# Patient Record
Sex: MALE | URBAN - METROPOLITAN AREA
[De-identification: names, ages, dates, MRNs, and addresses within clinical notes are randomized per-mention and may not be internally consistent; named-entity substitution may affect disease eponyms.]

---

## 2019-07-27 ENCOUNTER — INPATIENT (INPATIENT)
Facility: HOSPITAL | Age: 29
LOS: 2 days | Discharge: HOME | End: 2019-07-30
Attending: SURGERY | Admitting: SURGERY
Payer: MEDICAID

## 2019-07-27 VITALS
RESPIRATION RATE: 20 BRPM | HEART RATE: 100 BPM | SYSTOLIC BLOOD PRESSURE: 156 MMHG | OXYGEN SATURATION: 98 % | DIASTOLIC BLOOD PRESSURE: 111 MMHG | TEMPERATURE: 98 F

## 2019-07-27 LAB
ALBUMIN SERPL ELPH-MCNC: 4.4 G/DL — SIGNIFICANT CHANGE UP (ref 3.3–5)
ALP SERPL-CCNC: 104 U/L — SIGNIFICANT CHANGE UP (ref 40–120)
ALT FLD-CCNC: 310 U/L — HIGH (ref 4–41)
ANION GAP SERPL CALC-SCNC: 11 MMO/L — SIGNIFICANT CHANGE UP (ref 7–14)
AST SERPL-CCNC: 66 U/L — HIGH (ref 4–40)
BASE EXCESS BLDV CALC-SCNC: 7.8 MMOL/L — SIGNIFICANT CHANGE UP
BASOPHILS # BLD AUTO: 0.02 K/UL — SIGNIFICANT CHANGE UP (ref 0–0.2)
BASOPHILS NFR BLD AUTO: 0.3 % — SIGNIFICANT CHANGE UP (ref 0–2)
BILIRUB SERPL-MCNC: 0.5 MG/DL — SIGNIFICANT CHANGE UP (ref 0.2–1.2)
BLOOD GAS VENOUS - CREATININE: SIGNIFICANT CHANGE UP MG/DL (ref 0.5–1.3)
BUN SERPL-MCNC: 15 MG/DL — SIGNIFICANT CHANGE UP (ref 7–23)
CALCIUM SERPL-MCNC: 9.8 MG/DL — SIGNIFICANT CHANGE UP (ref 8.4–10.5)
CHLORIDE BLDV-SCNC: 102 MMOL/L — SIGNIFICANT CHANGE UP (ref 96–108)
CHLORIDE SERPL-SCNC: 98 MMOL/L — SIGNIFICANT CHANGE UP (ref 98–107)
CO2 SERPL-SCNC: 29 MMOL/L — SIGNIFICANT CHANGE UP (ref 22–31)
CREAT SERPL-MCNC: 1.06 MG/DL — SIGNIFICANT CHANGE UP (ref 0.5–1.3)
EOSINOPHIL # BLD AUTO: 0.13 K/UL — SIGNIFICANT CHANGE UP (ref 0–0.5)
EOSINOPHIL NFR BLD AUTO: 1.7 % — SIGNIFICANT CHANGE UP (ref 0–6)
GAS PNL BLDV: 137 MMOL/L — SIGNIFICANT CHANGE UP (ref 136–146)
GLUCOSE BLDV-MCNC: 111 MG/DL — HIGH (ref 70–99)
GLUCOSE SERPL-MCNC: 111 MG/DL — HIGH (ref 70–99)
HCO3 BLDV-SCNC: 29 MMOL/L — HIGH (ref 20–27)
HCT VFR BLD CALC: 46.2 % — SIGNIFICANT CHANGE UP (ref 39–50)
HCT VFR BLDV CALC: 47.7 % — SIGNIFICANT CHANGE UP (ref 39–51)
HGB BLD-MCNC: 15.3 G/DL — SIGNIFICANT CHANGE UP (ref 13–17)
HGB BLDV-MCNC: 15.6 G/DL — SIGNIFICANT CHANGE UP (ref 13–17)
IMM GRANULOCYTES NFR BLD AUTO: 0.1 % — SIGNIFICANT CHANGE UP (ref 0–1.5)
LACTATE BLDV-MCNC: 1.1 MMOL/L — SIGNIFICANT CHANGE UP (ref 0.5–2)
LIDOCAIN IGE QN: 173.2 U/L — HIGH (ref 7–60)
LYMPHOCYTES # BLD AUTO: 2.44 K/UL — SIGNIFICANT CHANGE UP (ref 1–3.3)
LYMPHOCYTES # BLD AUTO: 31.7 % — SIGNIFICANT CHANGE UP (ref 13–44)
MCHC RBC-ENTMCNC: 30.1 PG — SIGNIFICANT CHANGE UP (ref 27–34)
MCHC RBC-ENTMCNC: 33.1 % — SIGNIFICANT CHANGE UP (ref 32–36)
MCV RBC AUTO: 90.9 FL — SIGNIFICANT CHANGE UP (ref 80–100)
MONOCYTES # BLD AUTO: 0.58 K/UL — SIGNIFICANT CHANGE UP (ref 0–0.9)
MONOCYTES NFR BLD AUTO: 7.5 % — SIGNIFICANT CHANGE UP (ref 2–14)
NEUTROPHILS # BLD AUTO: 4.52 K/UL — SIGNIFICANT CHANGE UP (ref 1.8–7.4)
NEUTROPHILS NFR BLD AUTO: 58.7 % — SIGNIFICANT CHANGE UP (ref 43–77)
NRBC # FLD: 0 K/UL — SIGNIFICANT CHANGE UP (ref 0–0)
PCO2 BLDV: 59 MMHG — HIGH (ref 41–51)
PH BLDV: 7.37 PH — SIGNIFICANT CHANGE UP (ref 7.32–7.43)
PLATELET # BLD AUTO: 225 K/UL — SIGNIFICANT CHANGE UP (ref 150–400)
PMV BLD: 11.2 FL — SIGNIFICANT CHANGE UP (ref 7–13)
PO2 BLDV: 26 MMHG — LOW (ref 35–40)
POTASSIUM BLDV-SCNC: 4.6 MMOL/L — HIGH (ref 3.4–4.5)
POTASSIUM SERPL-MCNC: 4.8 MMOL/L — SIGNIFICANT CHANGE UP (ref 3.5–5.3)
POTASSIUM SERPL-SCNC: 4.8 MMOL/L — SIGNIFICANT CHANGE UP (ref 3.5–5.3)
PROT SERPL-MCNC: 7.8 G/DL — SIGNIFICANT CHANGE UP (ref 6–8.3)
RBC # BLD: 5.08 M/UL — SIGNIFICANT CHANGE UP (ref 4.2–5.8)
RBC # FLD: 13.1 % — SIGNIFICANT CHANGE UP (ref 10.3–14.5)
SAO2 % BLDV: 45.4 % — LOW (ref 60–85)
SODIUM SERPL-SCNC: 138 MMOL/L — SIGNIFICANT CHANGE UP (ref 135–145)
WBC # BLD: 7.7 K/UL — SIGNIFICANT CHANGE UP (ref 3.8–10.5)
WBC # FLD AUTO: 7.7 K/UL — SIGNIFICANT CHANGE UP (ref 3.8–10.5)

## 2019-07-27 RX ORDER — SODIUM CHLORIDE 9 MG/ML
1000 INJECTION INTRAMUSCULAR; INTRAVENOUS; SUBCUTANEOUS ONCE
Refills: 0 | Status: COMPLETED | OUTPATIENT
Start: 2019-07-27 | End: 2019-07-27

## 2019-07-27 RX ORDER — MORPHINE SULFATE 50 MG/1
4 CAPSULE, EXTENDED RELEASE ORAL ONCE
Refills: 0 | Status: DISCONTINUED | OUTPATIENT
Start: 2019-07-27 | End: 2019-07-27

## 2019-07-27 RX ORDER — SODIUM CHLORIDE 9 MG/ML
1000 INJECTION INTRAMUSCULAR; INTRAVENOUS; SUBCUTANEOUS ONCE
Refills: 0 | Status: DISCONTINUED | OUTPATIENT
Start: 2019-07-27 | End: 2019-07-27

## 2019-07-27 RX ADMIN — MORPHINE SULFATE 4 MILLIGRAM(S): 50 CAPSULE, EXTENDED RELEASE ORAL at 23:50

## 2019-07-27 RX ADMIN — SODIUM CHLORIDE 1000 MILLILITER(S): 9 INJECTION INTRAMUSCULAR; INTRAVENOUS; SUBCUTANEOUS at 23:35

## 2019-07-27 RX ADMIN — MORPHINE SULFATE 4 MILLIGRAM(S): 50 CAPSULE, EXTENDED RELEASE ORAL at 23:35

## 2019-07-27 NOTE — ED ADULT NURSE NOTE - OBJECTIVE STATEMENT
pt brought into intake 5 a&Ox4 amb self care male presents to the ed today from urgent care for pancreatitis and gallstones. patient was eating salad when he developed a sudden sharp onset on RUQ pain. patient hypertensive and in pain at this time. 20g iv placed in right ac. labs drawn and sent. abd soft and painful to touch.

## 2019-07-27 NOTE — ED ADULT TRIAGE NOTE - ACCOMPANIED BY
Spouse/Significant other
I have personally performed a face to face diagnostic evaluation on this patient. I have reviewed the ACP note and agree with the history, exam and plan of care, except as noted.

## 2019-07-27 NOTE — ED ADULT NURSE NOTE - ED STAT RN HANDOFF DETAILS
Patient is A&Ox4, aware of plan of care in NAD, and has room available.  Report given to nurse on floor via phone.  Patient awaiting transportation.  Will continue to monitor patient closely. NEETA Hawthorne R.N.

## 2019-07-27 NOTE — ED ADULT TRIAGE NOTE - CHIEF COMPLAINT QUOTE
Pt c/o upper abdominal pain radiating around to the back since this morning with pain worsening over the past couple of hours. Pt states pain radiates around the right side to the back accompanied by chills and sweating. Pt states he was seen in urgent care for same this afternoon and was advised to go to the ER to rule out gall stones. Pt denies CP/Nausea/Vomiting.

## 2019-07-28 DIAGNOSIS — K85.10 BILIARY ACUTE PANCREATITIS WITHOUT NECROSIS OR INFECTION: ICD-10-CM

## 2019-07-28 DIAGNOSIS — Z90.49 ACQUIRED ABSENCE OF OTHER SPECIFIED PARTS OF DIGESTIVE TRACT: Chronic | ICD-10-CM

## 2019-07-28 LAB
ALBUMIN SERPL ELPH-MCNC: 3.4 G/DL — SIGNIFICANT CHANGE UP (ref 3.3–5)
ALP SERPL-CCNC: 83 U/L — SIGNIFICANT CHANGE UP (ref 40–120)
ALT FLD-CCNC: 220 U/L — HIGH (ref 4–41)
ANION GAP SERPL CALC-SCNC: 9 MMO/L — SIGNIFICANT CHANGE UP (ref 7–14)
APPEARANCE UR: CLEAR — SIGNIFICANT CHANGE UP
AST SERPL-CCNC: 42 U/L — HIGH (ref 4–40)
BILIRUB SERPL-MCNC: 0.6 MG/DL — SIGNIFICANT CHANGE UP (ref 0.2–1.2)
BILIRUB UR-MCNC: NEGATIVE — SIGNIFICANT CHANGE UP
BLOOD UR QL VISUAL: NEGATIVE — SIGNIFICANT CHANGE UP
BUN SERPL-MCNC: 12 MG/DL — SIGNIFICANT CHANGE UP (ref 7–23)
CALCIUM SERPL-MCNC: 8.9 MG/DL — SIGNIFICANT CHANGE UP (ref 8.4–10.5)
CHLORIDE SERPL-SCNC: 105 MMOL/L — SIGNIFICANT CHANGE UP (ref 98–107)
CO2 SERPL-SCNC: 27 MMOL/L — SIGNIFICANT CHANGE UP (ref 22–31)
COLOR SPEC: SIGNIFICANT CHANGE UP
CREAT SERPL-MCNC: 1.04 MG/DL — SIGNIFICANT CHANGE UP (ref 0.5–1.3)
GLUCOSE SERPL-MCNC: 102 MG/DL — HIGH (ref 70–99)
GLUCOSE UR-MCNC: NEGATIVE — SIGNIFICANT CHANGE UP
HCT VFR BLD CALC: 41 % — SIGNIFICANT CHANGE UP (ref 39–50)
HGB BLD-MCNC: 13.7 G/DL — SIGNIFICANT CHANGE UP (ref 13–17)
KETONES UR-MCNC: NEGATIVE — SIGNIFICANT CHANGE UP
LEUKOCYTE ESTERASE UR-ACNC: NEGATIVE — SIGNIFICANT CHANGE UP
LIDOCAIN IGE QN: 111.5 U/L — HIGH (ref 7–60)
MAGNESIUM SERPL-MCNC: 2 MG/DL — SIGNIFICANT CHANGE UP (ref 1.6–2.6)
MCHC RBC-ENTMCNC: 30.9 PG — SIGNIFICANT CHANGE UP (ref 27–34)
MCHC RBC-ENTMCNC: 33.4 % — SIGNIFICANT CHANGE UP (ref 32–36)
MCV RBC AUTO: 92.3 FL — SIGNIFICANT CHANGE UP (ref 80–100)
NITRITE UR-MCNC: NEGATIVE — SIGNIFICANT CHANGE UP
NRBC # FLD: 0 K/UL — SIGNIFICANT CHANGE UP (ref 0–0)
PH UR: 6 — SIGNIFICANT CHANGE UP (ref 5–8)
PHOSPHATE SERPL-MCNC: 2.9 MG/DL — SIGNIFICANT CHANGE UP (ref 2.5–4.5)
PLATELET # BLD AUTO: 202 K/UL — SIGNIFICANT CHANGE UP (ref 150–400)
PMV BLD: 11.9 FL — SIGNIFICANT CHANGE UP (ref 7–13)
POTASSIUM SERPL-MCNC: 4.5 MMOL/L — SIGNIFICANT CHANGE UP (ref 3.5–5.3)
POTASSIUM SERPL-SCNC: 4.5 MMOL/L — SIGNIFICANT CHANGE UP (ref 3.5–5.3)
PROT SERPL-MCNC: 6.1 G/DL — SIGNIFICANT CHANGE UP (ref 6–8.3)
PROT UR-MCNC: NEGATIVE — SIGNIFICANT CHANGE UP
RBC # BLD: 4.44 M/UL — SIGNIFICANT CHANGE UP (ref 4.2–5.8)
RBC # FLD: 13.2 % — SIGNIFICANT CHANGE UP (ref 10.3–14.5)
SODIUM SERPL-SCNC: 141 MMOL/L — SIGNIFICANT CHANGE UP (ref 135–145)
SP GR SPEC: 1.01 — SIGNIFICANT CHANGE UP (ref 1–1.04)
UROBILINOGEN FLD QL: NORMAL — SIGNIFICANT CHANGE UP
WBC # BLD: 7.32 K/UL — SIGNIFICANT CHANGE UP (ref 3.8–10.5)
WBC # FLD AUTO: 7.32 K/UL — SIGNIFICANT CHANGE UP (ref 3.8–10.5)

## 2019-07-28 PROCEDURE — 76705 ECHO EXAM OF ABDOMEN: CPT | Mod: 26

## 2019-07-28 PROCEDURE — 71046 X-RAY EXAM CHEST 2 VIEWS: CPT | Mod: 26

## 2019-07-28 RX ORDER — MORPHINE SULFATE 50 MG/1
4 CAPSULE, EXTENDED RELEASE ORAL EVERY 4 HOURS
Refills: 0 | Status: DISCONTINUED | OUTPATIENT
Start: 2019-07-28 | End: 2019-07-29

## 2019-07-28 RX ORDER — MORPHINE SULFATE 50 MG/1
4 CAPSULE, EXTENDED RELEASE ORAL EVERY 4 HOURS
Refills: 0 | Status: DISCONTINUED | OUTPATIENT
Start: 2019-07-28 | End: 2019-07-28

## 2019-07-28 RX ORDER — ENOXAPARIN SODIUM 100 MG/ML
40 INJECTION SUBCUTANEOUS DAILY
Refills: 0 | Status: DISCONTINUED | OUTPATIENT
Start: 2019-07-28 | End: 2019-07-30

## 2019-07-28 RX ORDER — SODIUM CHLORIDE 9 MG/ML
1000 INJECTION, SOLUTION INTRAVENOUS
Refills: 0 | Status: DISCONTINUED | OUTPATIENT
Start: 2019-07-28 | End: 2019-07-28

## 2019-07-28 RX ADMIN — SODIUM CHLORIDE 1000 MILLILITER(S): 9 INJECTION INTRAMUSCULAR; INTRAVENOUS; SUBCUTANEOUS at 00:40

## 2019-07-28 RX ADMIN — ENOXAPARIN SODIUM 40 MILLIGRAM(S): 100 INJECTION SUBCUTANEOUS at 13:36

## 2019-07-28 RX ADMIN — SODIUM CHLORIDE 1000 MILLILITER(S): 9 INJECTION INTRAMUSCULAR; INTRAVENOUS; SUBCUTANEOUS at 00:45

## 2019-07-28 NOTE — H&P ADULT - ASSESSMENT
29M p/w gallstone pancreatitis    -admit to B team surgery, Dr. Kelly  -NPO/IVF  -pain control  -strict Is and Os  -cholecystectomy on this admission    page [phone] with surgical questions    Rhianna Cotto, PGY-4

## 2019-07-28 NOTE — ED PROVIDER NOTE - OBJECTIVE STATEMENT
29 y.o male no pmhx coming in with RUQ/Epigastric pain that started last night after eating a salad. + chills today. Went to urgent care and had US done showing gall stones and labs drawn- received a call that he had pancreatitis and to come to the ED. Denies fevers, chills, chest pain, sob, cough, n/v/d, numbness, tingling, weakness, urinary symptoms. Denies any ETOH or drug use.

## 2019-07-28 NOTE — H&P ADULT - NSHPPHYSICALEXAM_GEN_ALL_CORE
General: WN/WD NAD  Neurology: A&Ox3, nonfocal, LOVING x 4  Head:  Normocephalic, atraumatic  ENT:  Mucosa moist, no ulcerations  Neck:  Supple, no sinuses or palpable masses  Lymphatic:  No palpable cervical, supraclavicular, axillary or inguinal adenopathy  Respiratory: CTA B/L  CV: RRR, S1S2, no murmur  Abdominal: Soft, moderate epigastric tenderness, milsly distended  MSK: No edema, + peripheral pulses, FROM all 4 extremity

## 2019-07-28 NOTE — H&P ADULT - NSHPLABSRESULTS_GEN_ALL_CORE
CBC Full  -  ( 27 Jul 2019 23:26 )  WBC Count : 7.70 K/uL  RBC Count : 5.08 M/uL  Hemoglobin : 15.3 g/dL  Hematocrit : 46.2 %  Platelet Count - Automated : 225 K/uL  Mean Cell Volume : 90.9 fL  Mean Cell Hemoglobin : 30.1 pg  Mean Cell Hemoglobin Concentration : 33.1 %  Auto Neutrophil # : 4.52 K/uL  Auto Lymphocyte # : 2.44 K/uL  Auto Monocyte # : 0.58 K/uL  Auto Eosinophil # : 0.13 K/uL  Auto Basophil # : 0.02 K/uL  Auto Neutrophil % : 58.7 %  Auto Lymphocyte % : 31.7 %  Auto Monocyte % : 7.5 %  Auto Eosinophil % : 1.7 %  Auto Basophil % : 0.3 %    07-27    138  |  98  |  15  ----------------------------<  111<H>  4.8   |  29  |  1.06    Ca    9.8      27 Jul 2019 23:26    TPro  7.8  /  Alb  4.4  /  TBili  0.5  /  DBili  x   /  AST  66<H>  /  ALT  310<H>  /  AlkPhos  104  07-27    LIVER FUNCTIONS - ( 27 Jul 2019 23:26 )  Alb: 4.4 g/dL / Pro: 7.8 g/dL / ALK PHOS: 104 u/L / ALT: 310 u/L / AST: 66 u/L / GGT: x           CAPILLARY BLOOD GLUCOSE

## 2019-07-28 NOTE — ED PROVIDER NOTE - ATTENDING CONTRIBUTION TO CARE
DR. DEXTER, ATTENDING MD-  I performed a face to face bedside interview with the patient regarding history of present illness, review of symptoms and past medical history. I completed an independent physical exam.  I have discussed the patient's plan of care with the PA.   Documentation as above in the note.    30 y/o male with epig pain, chills, diaphoresis since last night.  Went to [facility], told he had gallstones and pancreatitis.  Advised to go to ED.  Denies ha, neck stiffness, cp, sob, cough, n/v/d, dysuria, rash.  Afebrile, vs wnl, anxious, uncomfortable, dry mm, ctabil, s1s2 rrr no m/r/g, abd soft epig ttp no r/g, no cva tenderness b/l, no leg swelling b/l, no rash.  Concern for gallstone pancreatitis.  Obtain cbc, cmp, lipase, ruq u/s, give ivf, pain med, consult surgery.

## 2019-07-28 NOTE — PATIENT PROFILE ADULT - HAVE YOU EXPERIENCED A TRAUMATIC EVENT?
Stop all supplements until after surgery    Take only the lisinopril morning of surgery    Before Your Surgery      Call your surgeon if there is any change in your health. This includes signs of a cold or flu (such as a sore throat, runny nose, cough, rash or fever).    Do not smoke, drink alcohol or take over the counter medicine (unless your surgeon or primary care doctor tells you to) for the 24 hours before and after surgery.    If you take prescribed drugs: Follow your doctor s orders about which medicines to take and which to stop until after surgery.    Eating and drinking prior to surgery: follow the instructions from your surgeon    Take a shower or bath the night before surgery. Use the soap your surgeon gave you to gently clean your skin. If you do not have soap from your surgeon, use your regular soap. Do not shave or scrub the surgery site.  Wear clean pajamas and have clean sheets on your bed.    no

## 2019-07-28 NOTE — H&P ADULT - HISTORY OF PRESENT ILLNESS
29M generally healthy presents with two days of abdominal pain. As per patient and girlfriend, pain started two days ago associated with diarrhea. Pain mostly in the epigastrium and radiates to the back. Patient denies fevers or chills or nausea or vomiting. Patient was able to tolerate two cups of soup today.  He went to urgent care this am, and they sent him in after seeing his labs.     Patient denies any history of ETOH. Never had this before. Only surgery in the past is open appendectomy years ago. Has mild hypertension, not on medications.    In the ED, patient mildly tachycardic with normal blood pressure. Moderate epigastric tenderness on exam. Labs with WBC 7, t bili 0.5, alk phos 104, AST 66, , lipase 172. U/S showing distended gallbladder with stones

## 2019-07-28 NOTE — H&P ADULT - ATTENDING COMMENTS
I have personally interviewed and examined this patient, reviewed pertinent labs and imaging, and discussed the case with colleagues, residents, and physician assistants on B Team rounds.           The Acute Care Surgery (B Team) Attending Group Practice:  Dr. Rita Theodore, Dr. Fabiano Alejo, Dr. Edna Kelly, Dr. Huy Sue    urgent issues - spectra [phone] or [phone]  nonurgent issues - [phone]  patient appointments or afterhours - [phone]

## 2019-07-28 NOTE — ED PROVIDER NOTE - CLINICAL SUMMARY MEDICAL DECISION MAKING FREE TEXT BOX
29 y.o male no pmhx coming in with RUQ/Epigastric pain that started last night , outpatient US showing gallstones and told pancreatitis at urgent Care. Pt with RUQ tenderness. Will check labs, lipase, ua, US gallbladder, pain control, fluids, reassess. 29 y.o male no pmhx coming in with RUQ/Epigastric pain that started last night , outpatient US showing gallstones and told pancreatitis at urgent Care. Pt with RUQ tenderness. Will check labs, lipase, ua, US gallbladder, pain control, fluids, reassess.    See "Attending Attestation" for the attending's MDM.

## 2019-07-29 PROCEDURE — 93010 ELECTROCARDIOGRAM REPORT: CPT

## 2019-07-29 PROCEDURE — 99232 SBSQ HOSP IP/OBS MODERATE 35: CPT | Mod: 57

## 2019-07-29 RX ADMIN — ENOXAPARIN SODIUM 40 MILLIGRAM(S): 100 INJECTION SUBCUTANEOUS at 11:14

## 2019-07-29 NOTE — CHART NOTE - NSCHARTNOTEFT_GEN_A_CORE
GENERAL SURGERY PRE OP NOTE:      Preop Dx: gallstone pancreatitis   Surgeon: Dr. Pimentel   Procedure: laparoscopic cholecystectomy    Vital Signs Last 24 Hrs  T(C): 36.7 (29 Jul 2019 10:01), Max: 36.9 (29 Jul 2019 02:30)  T(F): 98 (29 Jul 2019 10:01), Max: 98.4 (29 Jul 2019 02:30)  HR: 69 (29 Jul 2019 10:01) (69 - 79)  BP: 114/76 (29 Jul 2019 10:01) (105/76 - 118/71)  BP(mean): --  RR: 18 (29 Jul 2019 10:01) (16 - 18)  SpO2: 94% (29 Jul 2019 10:01) (94% - 99%)                        13.7   7.32  )-----------( 202      ( 28 Jul 2019 06:26 )             41.0     07-28    141  |  105  |  12  ----------------------------<  102<H>  4.5   |  27  |  1.04    Ca    8.9      28 Jul 2019 06:26  Phos  2.9     07-28  Mg     2.0     07-28    TPro  6.1  /  Alb  3.4  /  TBili  0.6  /  DBili  x   /  AST  42<H>  /  ALT  220<H>  /  AlkPhos  83  07-28            A/P: 29y Male     - OR 7/29/19 for laparoscopic cholecystectomy   - NPO past midnight, except medications  - IVF while NPO  - Consent signed and in chart

## 2019-07-29 NOTE — DISCHARGE NOTE PROVIDER - CARE PROVIDER_API CALL
Edna Kelly (MD)  Surgery  [address], [address] [id number]  Phone: [phone]  Fax: [phone]  Follow Up Time:

## 2019-07-29 NOTE — PROGRESS NOTE ADULT - SUBJECTIVE AND OBJECTIVE BOX
GENERAL SURGERY PROGRESS NOTE    Interval: No acute events overnight     Subjective: Patient seen and examined at bedside. AVSS. Patient stated that pain is improving. No chills. -N/V. +F/-BM. Tolerating CLD      T(C): 36.7 (19 @ 05:31), Max: 36.9 (19 @ 02:30)  HR: 77 (19 @ 05:31) (69 - 79)  BP: 105/76 (19 @ 05:31) (105/76 - 119/70)  RR: 16 (19 @ 05:31) (16 - 18)  SpO2: 99% (19 @ 05:31) (96% - 100%)  Wt(kg): --    LABS:                        13.7   7.32  )-----------( 202      ( 2019 06:26 )             41.0         141  |  105  |  12  ----------------------------<  102<H>  4.5   |  27  |  1.04    Ca    8.9      2019 06:26  Phos  2.9       Mg     2.0         TPro  6.1  /  Alb  3.4  /  TBili  0.6  /  DBili  x   /  AST  42<H>  /  ALT  220<H>  /  AlkPhos  83        Urinalysis Basic - ( 2019 01:30 )    Color: LIGHT YELLOW / Appearance: CLEAR / S.009 / pH: 6.0  Gluc: NEGATIVE / Ketone: NEGATIVE  / Bili: NEGATIVE / Urobili: NORMAL   Blood: NEGATIVE / Protein: NEGATIVE / Nitrite: NEGATIVE   Leuk Esterase: NEGATIVE / RBC: x / WBC x   Sq Epi: x / Non Sq Epi: x / Bacteria: x        CAPILLARY BLOOD GLUCOSE          CAPILLARY BLOOD GLUCOSE        CAPILLARY BLOOD GLUCOSE                  RECENT CULTURES:      Is&O's    19 @ 07:01  -  19 @ 07:00  --------------------------------------------------------  IN: 0 mL / OUT: 3825 mL / NET: -3825 mL        MEDS  enoxaparin Injectable 40 milliGRAM(s) SubCutaneous daily  morphine  - Injectable 4 milliGRAM(s) IV Push every 4 hours PRN                    < from: US Abdomen Limited (19 @ 00:05) >  IMPRESSION:     Sonographic findings equivocal for acute cholecystitis. If there is   clinical suspicion for acute cholecystitis, a hepatobiliary scan may be   obtained for further evaluation.    Poorly visualized pancreas.        < end of copied text >

## 2019-07-29 NOTE — DISCHARGE NOTE PROVIDER - NSDCCPTREATMENT_GEN_ALL_CORE_FT
PRINCIPAL PROCEDURE  Procedure: Laparoscopic cholecystectomy  Findings and Treatment: PRINCIPAL PROCEDURE  Procedure: Laparoscopic cholecystectomy  Findings and Treatment: Pt underwent laparoscopic cholecystectomy for gallstone pancreatitis, cystic duct and artery identified and clipped with 5mm endoclips. 10 mL of blood loss.      SECONDARY PROCEDURE  Procedure: US abdomen complete  Findings and Treatment: FINDINGS: Liver: 14.7 cm in length. Within normal limits; Bile ducts: Normal caliber. Visualized common bile duct measures 4 mm; gallbladder: Filled with stones. No obvious gallbladder wall thickening   or pericholecystic fluid. Negative sonographic Gomez sign. However, the  patient was premedicated; Pancreas: Poorly visualized due to bowel gas; Right kidney: 10.9 cm. No hydronephrosis; Ascites: None; IVC: Visualized portions are within normal limits.  IMPRESSION: Sonographic findings equivocal for acute cholecystitis. If there is clinical suspicion for acute cholecystitis, a hepatobiliary scan may be obtained for further evaluation. Poorly visualized pancreas.    Procedure: XR chest, 2 views  Findings and Treatment: IMPRESSION: Clear lungs.

## 2019-07-29 NOTE — DISCHARGE NOTE PROVIDER - NSDCFUADDINST_GEN_ALL_CORE_FT
WOUND CARE:  Please keep incisions clean and dry. Please do not Scrub or rub incisions. Do not use lotion or powder on incisions.   BATHING: You may shower and/or sponge bathe. You may use warm soapy water in the shower and rinse, pat dry.  ACTIVITY: No heavy lifting or straining. Otherwise, you may return to your usual level of physical activity. If you are taking narcotic pain medication DO NOT drive a car, operate machinery or make important decisions.  DIET: Return to your usual diet.  NOTIFY YOUR SURGEON IF: You have any bleeding that does not stop, any pus draining from your wound(s), increased pain at surgical site, any fever (over 100.4 F) persistent nausea/vomiting, or if your pain is not controlled on your discharge pain medications.  Please follow up with your primary care physician in 1-2 weeks regarding your hospitalization.  Please follow up with your surgeon, Dr. Kelly in 1 week. Please call office to make an appointment.

## 2019-07-29 NOTE — DISCHARGE NOTE PROVIDER - HOSPITAL COURSE
Mr. Pham is a 29yom who presented to the ED on 7/28 with two days of epigastric pain which radiated to the back associated with diarrhea.   In the ED patient was afebrile and found to have WBC of 7, t bili 0.5, alk phos 104, AST 66, , lipase 172 with U/S showing distended gallbladder with stones.  Patient was made NPO and given IVF.  Eventually, the patient tolerated a regular diet and was discharged with plans to follow-up for an elective cholecystectomy.     . Mr. Pham is a 29yom who presented to the ED on 7/28 with two days of epigastric pain which radiated to the back associated with diarrhea.   In the ED patient was afebrile and found to have WBC of 7, t bili 0.5, alk phos 104, AST 66, , lipase 172 with U/S showing distended gallbladder with stones.  Patient was made NPO and given IVF.  Pt was taken to OR on 7/30 for laparoscopic cholecystectomy. During hospital course patients diet was slowly advanced as tolerated.  At this time, pt is tolerating a regular diet, ambulating and voiding.  Pt has been deemed stable for discharge at this time by attending. Mr. Pham is a 30 y/o man who presented to the ED on 7/28 with two days of epigastric pain which radiated to the back associated with nausea diarrhea.   In the ED, the patient was afebrile and found to have WBC of 7, total bilirubin 0.5, alk phos 104, AST 66, , lipase 172 with U/S showing distended gallbladder with stones suggestive of gallstone pancreatitis.  The patient was made NPO, given IVF and taken to the OR on 7/30/19 for laparoscopic cholecystectomy. The procedure was conducted safely and effectively with gallbladder removal and minimal blood loss. Post-operatively, the patient denied any nausea or vomiting, abdominal pain outside of the wound sites, and diarrhea/constipation. He voided twice after surgery without dysuria or hematuria. Throughout the hospital course, the patient's diet was slowly advanced as tolerated.  At this time, the pateint is tolerating a regular diet, ambulating and voiding. The patient has been deemed stable for discharge at this time by attending.

## 2019-07-29 NOTE — DISCHARGE NOTE PROVIDER - NSDCCPCAREPLAN_GEN_ALL_CORE_FT
PRINCIPAL DISCHARGE DIAGNOSIS  Diagnosis: Gallstone pancreatitis  Assessment and Plan of Treatment: PRINCIPAL DISCHARGE DIAGNOSIS  Diagnosis: Gallstone pancreatitis  Assessment and Plan of Treatment: You were diagnosed with gallstone pancreatitis. When you arrived, you had symptoms of pancreatitis with an elevated lipase and imaging evidence of gallstones within your gallbladder. Your symptoms improved with supportive measures throughout your hospital stay, and you decided to have an elective laparoscopic cholecystectomy while in the hospital to prevent future recurrence of gallstone pancreatitis. This operation was successful. Please take your pain medications as needed, and follow-up with the surgeon.

## 2019-07-29 NOTE — PROGRESS NOTE ADULT - ATTENDING COMMENTS
Seen and examined, chart and note reviewed, case discussed with B team    GS pancreatitis  a.  Patient feels better, tolerating clears  b.  Mild tenderness on exam, labs WNL except for improving lipase  c.  Discuss re: management, risks and  benefits, patient opts for in-patient cholecystectomy

## 2019-07-29 NOTE — PROGRESS NOTE ADULT - ASSESSMENT
29M p/w gallstone pancreatitis    Plan  - Diet: Advance to Regular  - D/c today  - f/u outpatient for elective cholecystectomy    Gen Surg Team B  [id number]

## 2019-07-30 VITALS
TEMPERATURE: 98 F | SYSTOLIC BLOOD PRESSURE: 117 MMHG | HEART RATE: 92 BPM | DIASTOLIC BLOOD PRESSURE: 56 MMHG | OXYGEN SATURATION: 95 % | RESPIRATION RATE: 18 BRPM

## 2019-07-30 LAB
ALBUMIN SERPL ELPH-MCNC: 3.9 G/DL — SIGNIFICANT CHANGE UP (ref 3.3–5)
ALP SERPL-CCNC: 79 U/L — SIGNIFICANT CHANGE UP (ref 40–120)
ALT FLD-CCNC: 141 U/L — HIGH (ref 4–41)
ANION GAP SERPL CALC-SCNC: 12 MMO/L — SIGNIFICANT CHANGE UP (ref 7–14)
APTT BLD: 28 SEC — SIGNIFICANT CHANGE UP (ref 27.5–36.3)
AST SERPL-CCNC: 26 U/L — SIGNIFICANT CHANGE UP (ref 4–40)
BILIRUB SERPL-MCNC: 0.2 MG/DL — SIGNIFICANT CHANGE UP (ref 0.2–1.2)
BLD GP AB SCN SERPL QL: NEGATIVE — SIGNIFICANT CHANGE UP
BUN SERPL-MCNC: 13 MG/DL — SIGNIFICANT CHANGE UP (ref 7–23)
CALCIUM SERPL-MCNC: 9.6 MG/DL — SIGNIFICANT CHANGE UP (ref 8.4–10.5)
CHLORIDE SERPL-SCNC: 105 MMOL/L — SIGNIFICANT CHANGE UP (ref 98–107)
CO2 SERPL-SCNC: 26 MMOL/L — SIGNIFICANT CHANGE UP (ref 22–31)
CREAT SERPL-MCNC: 1 MG/DL — SIGNIFICANT CHANGE UP (ref 0.5–1.3)
GLUCOSE SERPL-MCNC: 100 MG/DL — HIGH (ref 70–99)
HCT VFR BLD CALC: 43.2 % — SIGNIFICANT CHANGE UP (ref 39–50)
HGB BLD-MCNC: 14.4 G/DL — SIGNIFICANT CHANGE UP (ref 13–17)
INR BLD: 0.99 — SIGNIFICANT CHANGE UP (ref 0.88–1.17)
MAGNESIUM SERPL-MCNC: 2.1 MG/DL — SIGNIFICANT CHANGE UP (ref 1.6–2.6)
MCHC RBC-ENTMCNC: 29.8 PG — SIGNIFICANT CHANGE UP (ref 27–34)
MCHC RBC-ENTMCNC: 33.3 % — SIGNIFICANT CHANGE UP (ref 32–36)
MCV RBC AUTO: 89.3 FL — SIGNIFICANT CHANGE UP (ref 80–100)
NRBC # FLD: 0 K/UL — SIGNIFICANT CHANGE UP (ref 0–0)
PHOSPHATE SERPL-MCNC: 3.6 MG/DL — SIGNIFICANT CHANGE UP (ref 2.5–4.5)
PLATELET # BLD AUTO: 241 K/UL — SIGNIFICANT CHANGE UP (ref 150–400)
PMV BLD: 11.6 FL — SIGNIFICANT CHANGE UP (ref 7–13)
POTASSIUM SERPL-MCNC: 4 MMOL/L — SIGNIFICANT CHANGE UP (ref 3.5–5.3)
POTASSIUM SERPL-SCNC: 4 MMOL/L — SIGNIFICANT CHANGE UP (ref 3.5–5.3)
PROT SERPL-MCNC: 7 G/DL — SIGNIFICANT CHANGE UP (ref 6–8.3)
PROTHROM AB SERPL-ACNC: 11.3 SEC — SIGNIFICANT CHANGE UP (ref 9.8–13.1)
RBC # BLD: 4.84 M/UL — SIGNIFICANT CHANGE UP (ref 4.2–5.8)
RBC # FLD: 12.7 % — SIGNIFICANT CHANGE UP (ref 10.3–14.5)
RH IG SCN BLD-IMP: POSITIVE — SIGNIFICANT CHANGE UP
SODIUM SERPL-SCNC: 143 MMOL/L — SIGNIFICANT CHANGE UP (ref 135–145)
WBC # BLD: 4.96 K/UL — SIGNIFICANT CHANGE UP (ref 3.8–10.5)
WBC # FLD AUTO: 4.96 K/UL — SIGNIFICANT CHANGE UP (ref 3.8–10.5)

## 2019-07-30 PROCEDURE — 88304 TISSUE EXAM BY PATHOLOGIST: CPT | Mod: 26

## 2019-07-30 PROCEDURE — 47562 LAPAROSCOPIC CHOLECYSTECTOMY: CPT

## 2019-07-30 RX ORDER — OXYCODONE HYDROCHLORIDE 5 MG/1
1 TABLET ORAL
Qty: 8 | Refills: 0
Start: 2019-07-30 | End: 2019-07-31

## 2019-07-30 RX ORDER — ACETAMINOPHEN 500 MG
2 TABLET ORAL
Qty: 0 | Refills: 0 | DISCHARGE
Start: 2019-07-30

## 2019-07-30 RX ORDER — MORPHINE SULFATE 50 MG/1
2 CAPSULE, EXTENDED RELEASE ORAL EVERY 4 HOURS
Refills: 0 | Status: DISCONTINUED | OUTPATIENT
Start: 2019-07-30 | End: 2019-07-30

## 2019-07-30 RX ORDER — OXYCODONE HYDROCHLORIDE 5 MG/1
5 TABLET ORAL EVERY 6 HOURS
Refills: 0 | Status: DISCONTINUED | OUTPATIENT
Start: 2019-07-30 | End: 2019-07-30

## 2019-07-30 RX ORDER — HYDROMORPHONE HYDROCHLORIDE 2 MG/ML
1 INJECTION INTRAMUSCULAR; INTRAVENOUS; SUBCUTANEOUS
Refills: 0 | Status: DISCONTINUED | OUTPATIENT
Start: 2019-07-30 | End: 2019-07-30

## 2019-07-30 RX ORDER — SODIUM CHLORIDE 9 MG/ML
1000 INJECTION, SOLUTION INTRAVENOUS
Refills: 0 | Status: DISCONTINUED | OUTPATIENT
Start: 2019-07-30 | End: 2019-07-30

## 2019-07-30 RX ORDER — HYDROMORPHONE HYDROCHLORIDE 2 MG/ML
0.5 INJECTION INTRAMUSCULAR; INTRAVENOUS; SUBCUTANEOUS
Refills: 0 | Status: DISCONTINUED | OUTPATIENT
Start: 2019-07-30 | End: 2019-07-30

## 2019-07-30 RX ORDER — ONDANSETRON 8 MG/1
4 TABLET, FILM COATED ORAL ONCE
Refills: 0 | Status: DISCONTINUED | OUTPATIENT
Start: 2019-07-30 | End: 2019-07-30

## 2019-07-30 RX ORDER — ACETAMINOPHEN 500 MG
650 TABLET ORAL EVERY 6 HOURS
Refills: 0 | Status: DISCONTINUED | OUTPATIENT
Start: 2019-07-30 | End: 2019-07-30

## 2019-07-30 RX ADMIN — OXYCODONE HYDROCHLORIDE 5 MILLIGRAM(S): 5 TABLET ORAL at 12:51

## 2019-07-30 RX ADMIN — OXYCODONE HYDROCHLORIDE 5 MILLIGRAM(S): 5 TABLET ORAL at 13:51

## 2019-07-30 NOTE — DISCHARGE NOTE NURSING/CASE MANAGEMENT/SOCIAL WORK - NSDCDPATPORTLINK_GEN_ALL_CORE
You can access the [facility], offered by [facility], by registering with the following website: [email or web address]

## 2019-07-30 NOTE — BRIEF OPERATIVE NOTE - OPERATION/FINDINGS
laparoscopic cholecystectomy for gallstone pancreatitis, cystic duct and artery identified and clipped with 5mm endoclips

## 2019-07-30 NOTE — PROGRESS NOTE ADULT - SUBJECTIVE AND OBJECTIVE BOX
GENERAL SURGERY PROGRESS NOTE  Juan Salmeron, MS3  Cell: [phone]    Interval: No acute events overnight     Subjective: The patient was seen and examined at bedside at 05:30. Patient states that he no longer has abdominal pain. Denies N/V, fevers/chills, CP or SOB. He had one bowel movement last night - brown, solid stool. Has been NPO since midnight.     Vital Signs Last 24 Hrs  T(C): 36.9 (2019 02:01), Max: 36.9 (2019 02:)  T(F): 98.4 (2019 02:), Max: 98.4 (2019 02:)  HR: 65 (:) (65 - 75)  BP: 119/76 (2019 02:) (106/73 - 122/80)  BP(mean): --  RR: 18 (2019 02:) (18 - 18)  SpO2: 100% (2019 02:) (94% - 100%)    LABS:      141  |  105  |  12  ----------------------------<  102<H>  4.5   |  27  |  1.04      138  |  98  |  15  ----------------------------<  111<H>  4.8   |  29  |  1.06    Ca    8.9      2019 06:26  Ca    9.8      2019 23:26  Phos  2.9       Mg     2.0         TPro  6.1  /  Alb  3.4  /  TBili  0.6  /  DBili  x   /  AST  42<H>  /  ALT  220<H>  /  AlkPhos  83    TPro  7.8  /  Alb  4.4  /  TBili  0.5  /  DBili  x   /  AST  66<H>  /  ALT  310<H>  /  AlkPhos  104                    Urinalysis Basic - ( 2019 01:30 )    Color: LIGHT YELLOW / Appearance: CLEAR / S.009 / pH: 6.0  Gluc: NEGATIVE / Ketone: NEGATIVE  / Bili: NEGATIVE / Urobili: NORMAL   Blood: NEGATIVE / Protein: NEGATIVE / Nitrite: NEGATIVE   Leuk Esterase: NEGATIVE / RBC: x / WBC x   Sq Epi: x / Non Sq Epi: x / Bacteria: x    RECENT CULTURES: None.    2019 07:  -  2019 07:00  --------------------------------------------------------  IN:  Total IN: 0 mL    OUT:    Voided: 3825 mL  Total OUT: 3825 mL    Total NET: -3825 mL      2019 07:  -  2019 05:37  --------------------------------------------------------  IN:    Oral Fluid: 360 mL  Total IN: 360 mL    OUT:    Voided: 475 mL  Total OUT: 475 mL    Total NET: -115 mL      MEDS  enoxaparin Injectable 40 milliGRAM(s) SubCutaneous daily  morphine  - Injectable 4 milliGRAM(s) IV Push every 4 hours PRN    < from: US Abdomen Limited (19 @ 00:05) >  IMPRESSION:     Sonographic findings equivocal for acute cholecystitis. If there is   clinical suspicion for acute cholecystitis, a hepatobiliary scan may be   obtained for further evaluation.    Poorly visualized pancreas.    < end of copied text > GENERAL SURGERY PROGRESS NOTE  Juan Salmeron MS3  Cell: [phone]    Interval: No acute events overnight     Subjective: The patient was seen and examined at bedside at 05:30. Patient states that he no longer has abdominal pain. Denies N/V, fevers/chills, CP or SOB. He had one bowel movement last night - brown, solid stool. Has been NPO since midnight.     PHYSICAL EXAM:  GENERAL: NAD, well-groomed, well-developed  EYES: conjunctiva and sclera clear  CHEST/LUNG: Clear to ausculation bilaterally; No rales, rhonchi, wheezing, or rubs  HEART: Regular rate and rhythm; No murmurs, rubs, or gallops  ABDOMEN: Soft, Nontender, Nondistended; Bowel sounds present  EXTREMITIES:  2+ Peripheral Pulses, No clubbing, cyanosis, or edema  SKIN: No rashes or lesions  NERVOUS SYSTEM:  Alert & Oriented X3, Good concentration;     Vital Signs Last 24 Hrs  T(C): 36.9 (2019 02:01), Max: 36.9 (2019 02:01)  T(F): 98.4 (2019 02:01), Max: 98.4 (2019 02:01)  HR: 65 (2019 02:) (65 - 75)  BP: 119/76 (2019 02:01) (106/73 - 122/80)  BP(mean): --  RR: 18 (2019 02:) (18 - 18)  SpO2: 100% (2019 02:) (94% - 100%)    LABS:      141  |  105  |  12  ----------------------------<  102<H>  4.5   |  27  |  1.04      138  |  98  |  15  ----------------------------<  111<H>  4.8   |  29  |  1.06    Ca    8.9      2019 06:26  Ca    9.8      2019 23:26  Phos  2.9       Mg     2.0         TPro  6.1  /  Alb  3.4  /  TBili  0.6  /  DBili  x   /  AST  42<H>  /  ALT  220<H>  /  AlkPhos  83    TPro  7.8  /  Alb  4.4  /  TBili  0.5  /  DBili  x   /  AST  66<H>  /  ALT  310<H>  /  AlkPhos  104                    Urinalysis Basic - ( 2019 01:30 )    Color: LIGHT YELLOW / Appearance: CLEAR / S.009 / pH: 6.0  Gluc: NEGATIVE / Ketone: NEGATIVE  / Bili: NEGATIVE / Urobili: NORMAL   Blood: NEGATIVE / Protein: NEGATIVE / Nitrite: NEGATIVE   Leuk Esterase: NEGATIVE / RBC: x / WBC x   Sq Epi: x / Non Sq Epi: x / Bacteria: x    RECENT CULTURES: None.    2019 07:  -  2019 07:00  --------------------------------------------------------  IN:  Total IN: 0 mL    OUT:    Voided: 3825 mL  Total OUT: 3825 mL    Total NET: -3825 mL      2019 07:  -  2019 05:37  --------------------------------------------------------  IN:    Oral Fluid: 360 mL  Total IN: 360 mL    OUT:    Voided: 475 mL  Total OUT: 475 mL    Total NET: -115 mL      MEDS  enoxaparin Injectable 40 milliGRAM(s) SubCutaneous daily  morphine  - Injectable 4 milliGRAM(s) IV Push every 4 hours PRN    < from: US Abdomen Limited (19 @ 00:05) >  IMPRESSION:     Sonographic findings equivocal for acute cholecystitis. If there is   clinical suspicion for acute cholecystitis, a hepatobiliary scan may be   obtained for further evaluation.    Poorly visualized pancreas.    < end of copied text >

## 2019-07-30 NOTE — CHART NOTE - NSCHARTNOTEFT_GEN_A_CORE
S: Patient doing well, denies fevers, chills, nausea, emesis, chest pain, SOB.  Pain is well controlled.  Still not passing flatus or having a bowel movement.  Has voided.  Has ambulated.    O: Vital Signs Last 24 Hrs  T(C): 36.6 (30 Jul 2019 12:38), Max: 36.9 (30 Jul 2019 02:01)  T(F): 97.9 (30 Jul 2019 12:38), Max: 98.4 (30 Jul 2019 02:01)  HR: 72 (30 Jul 2019 12:38) (58 - 91)  BP: 133/84 (30 Jul 2019 12:38) (106/73 - 153/95)  BP(mean): 80 (30 Jul 2019 12:00) (69 - 94)  RR: 16 (30 Jul 2019 12:38) (10 - 21)  SpO2: 99% (30 Jul 2019 12:38) (94% - 100%)      29 Jul 2019 07:01  -  30 Jul 2019 07:00  --------------------------------------------------------  IN:    Oral Fluid: 360 mL  Total IN: 360 mL    OUT:    Voided: 875 mL  Total OUT: 875 mL    Total NET: -515 mL      30 Jul 2019 07:01  -  30 Jul 2019 16:29  --------------------------------------------------------  IN:    lactated ringers.: 150 mL    Oral Fluid: 360 mL  Total IN: 510 mL    OUT:    Voided: 150 mL  Total OUT: 150 mL    Total NET: 360 mL          Physical Exam:    Gen: Well-developed, well-nourished in no acute distress  Resp: Clear to auscultation bilaterally with no wheezes, rale, or rhonchi  CV: Regular rate and rhythm with no murmur, gallop, or rub  GI: Soft, non-tender, non-distended with normoactive bowel sounds.  No masses.  Lap dressings clean, dry and intact.  MSK: Moves all extremities equally  Skin: No rashes    Labs:                        14.4   4.96  )-----------( 241      ( 30 Jul 2019 06:30 )             43.2     30 Jul 2019 06:30    143    |  105    |  13     ----------------------------<  100    4.0     |  26     |  1.00     Ca    9.6        30 Jul 2019 06:30  Phos  3.6       30 Jul 2019 06:30  Mg     2.1       30 Jul 2019 06:30    TPro  7.0    /  Alb  3.9    /  TBili  0.2    /  DBili  x      /  AST  26     /  ALT  141    /  AlkPhos  79     30 Jul 2019 06:30    PT/INR - ( 30 Jul 2019 06:30 )   PT: 11.3 SEC;   INR: 0.99          PTT - ( 30 Jul 2019 06:30 )  PTT:28.0 SEC  CAPILLARY BLOOD GLUCOSE            LIVER FUNCTIONS - ( 30 Jul 2019 06:30 )  Alb: 3.9 g/dL / Pro: 7.0 g/dL / ALK PHOS: 79 u/L / ALT: 141 u/L / AST: 26 u/L / GGT: x                 MEDICATIONS  (STANDING):  enoxaparin Injectable 40 milliGRAM(s) SubCutaneous daily  lactated ringers. 1000 milliLiter(s) (75 mL/Hr) IV Continuous <Continuous>    MEDICATIONS  (PRN):  acetaminophen   Tablet .. 650 milliGRAM(s) Oral every 6 hours PRN Mild Pain (1 - 3)  morphine  - Injectable 2 milliGRAM(s) IV Push every 4 hours PRN Severe Pain (7 - 10)  oxyCODONE    IR 5 milliGRAM(s) Oral every 6 hours PRN Severe Pain (7 - 10)    Assessment:    MICHAEL WILL is a 29y Male who is now POD #0 from a laparoscopic cholecystectomy for acute cholecystitis.  Doing well with good pain control, ambulation, and voiding likely good for discharge today.    B team pager [phone]

## 2019-07-30 NOTE — PROGRESS NOTE ADULT - ASSESSMENT
This is a 28 y/o man with PMHx of hypertension that's well controlled who presented to the emergency room with epigastric abdominal pain and nausea likely 2/2 gallstone pancreatitis as evidenced by an elevated lipase and abdominal ultrasound, no pending elective laparoscopic cholecystectomy.     Plan  - Diet: NPO  - Laparoscopic cholecystectomy today  - F/u post-op    Gen Surg Team B  [id number] This is a 28 y/o man with PMHx of hypertension now presenting with epigastric pain and nausea likely 2/2 gallstone pancreatitis as evidenced by an elevated lipase and positive abdominal ultrasound, now pending elective laparoscopic cholecystectomy.     Plan  - Diet: NPO  - Laparoscopic cholecystectomy today  - F/u post-op    Gen Surg Team B  [id number]

## 2019-08-01 ENCOUNTER — OUTPATIENT (OUTPATIENT)
Dept: OUTPATIENT SERVICES | Facility: HOSPITAL | Age: 29
LOS: 1 days | End: 2019-08-01
Payer: MEDICAID

## 2019-08-01 DIAGNOSIS — Z90.49 ACQUIRED ABSENCE OF OTHER SPECIFIED PARTS OF DIGESTIVE TRACT: Chronic | ICD-10-CM

## 2019-08-01 PROCEDURE — G9001: CPT

## 2019-08-12 DIAGNOSIS — Z71.89 OTHER SPECIFIED COUNSELING: ICD-10-CM

## 2019-08-12 PROBLEM — I10 ESSENTIAL (PRIMARY) HYPERTENSION: Chronic | Status: ACTIVE | Noted: 2019-07-28

## 2023-07-13 NOTE — PRE-OP CHECKLIST - AICD PRESENT
Problem: At Risk for Falls  Goal: # Patient does not fall  Outcome: Outcome Met, Continue evaluating goal progress toward completion     Problem: At Risk for Injury Due to Fall  Goal: # Patient does not fall  Outcome: Outcome Met, Continue evaluating goal progress toward completion     Problem: VTE, Risk for  Goal: # No s/s of VTE  Outcome: Outcome Met, Continue evaluating goal progress toward completion     Problem: Delirium, Risk for  Goal: # No symptoms of delirium  Description: Evaluate delirium symptoms under active problem when present  Outcome: Outcome Not Met, Continue to Monitor     Problem: Diabetes  Goal: Achieves glycemic balance  Description: Goal is to maintain blood sugar within range with no episodes of hypoglycemia  Outcome: Outcome Not Met, Continue to Monitor     Problem: At Risk for Falls  Goal: # Takes action to control fall-related risks  Outcome: Outcome Not Met, Continue to Monitor     Problem: At Risk for Injury Due to Fall  Goal: # Takes action to control condition specific risks  Outcome: Outcome Not Met, Continue to Monitor     Problem:  Activity Intolerance  Goal: # Functional status is maintained or returned to baseline  Outcome: Outcome Not Met, Continue to Monitor no
